# Patient Record
Sex: MALE | URBAN - METROPOLITAN AREA
[De-identification: names, ages, dates, MRNs, and addresses within clinical notes are randomized per-mention and may not be internally consistent; named-entity substitution may affect disease eponyms.]

---

## 2019-10-03 ENCOUNTER — TELEPHONE (OUTPATIENT)
Dept: UROLOGY | Facility: CLINIC | Age: 59
End: 2019-10-03

## 2019-10-03 NOTE — TELEPHONE ENCOUNTER
Spoke w pt regarding urological history and reason for visit pt  stated that he will be seeing us for BPH which he states he is being referred by [facility]. Pt declined seeing a urologist in the past states he psa lab history should be documented at [facility] will request records.Appt confirmed

## 2019-10-03 NOTE — TELEPHONE ENCOUNTER
Spoke w pt informed referral received from [facility] for BPH pts insurance was verified and was informed that our department is not taking medicaid at this moment. Pt was offered the medicaid # but declined. I aso offered Dr Fernandes number and informed pt he services the [facility] will mail contact for that provider. Pt informed appt will be canceled with Dr Mccann. Pt voiced ok and understanding.

## 2024-07-27 ENCOUNTER — CLINICAL SUPPORT (OUTPATIENT)
Dept: OTHER | Facility: CLINIC | Age: 64
End: 2024-07-27

## 2024-07-27 DIAGNOSIS — Z00.8 ENCOUNTER FOR OTHER GENERAL EXAMINATION: ICD-10-CM

## 2024-07-31 VITALS — DIASTOLIC BLOOD PRESSURE: 74 MMHG | SYSTOLIC BLOOD PRESSURE: 120 MMHG

## 2024-07-31 LAB
HDLC SERPL-MCNC: 35 MG/DL
POC CHOLESTEROL, LDL (DOCK): 99 MG/DL
POC CHOLESTEROL, TOTAL: 153 MG/DL
POC GLUCOSE, FASTING: 97 MG/DL (ref 60–110)
TRIGL SERPL-MCNC: 101 MG/DL